# Patient Record
Sex: FEMALE | NOT HISPANIC OR LATINO | Employment: OTHER | URBAN - METROPOLITAN AREA
[De-identification: names, ages, dates, MRNs, and addresses within clinical notes are randomized per-mention and may not be internally consistent; named-entity substitution may affect disease eponyms.]

---

## 2019-04-03 ENCOUNTER — HOSPITAL ENCOUNTER (OUTPATIENT)
Dept: OTHER | Facility: HOSPITAL | Age: 84
Discharge: HOME | End: 2019-04-03
Attending: INTERNAL MEDICINE

## 2019-04-03 LAB
APPEARANCE UR: CLEAR
BILIRUB UR QL: NEGATIVE
COLOR UR: YELLOW
CONV COLLECTION SOURCE (UA): NORMAL
CONV UROBILINOGEN IN URINE BY AUTOMATED TEST STRIP: 1 {EHRLICHU}/DL (ref 0.1–1)
GLUCOSE UR QL: NEGATIVE MG/DL
HGB UR QL STRIP: NEGATIVE
KETONES UR QL STRIP: NEGATIVE MG/DL
LEUKOCYTE ESTERASE UR QL STRIP: NEGATIVE
NITRITE UR QL STRIP: NEGATIVE
PH UR STRIP.AUTO: 5.5 [PH] (ref 5–8)
PROT UR QL: NEGATIVE MG/DL
SP GR UR: 1.02 (ref 1–1.03)

## 2019-04-05 LAB — BACTERIA UR CULT: NORMAL

## 2019-10-02 ENCOUNTER — HOSPITAL ENCOUNTER (OUTPATIENT)
Dept: CARDIOLOGY | Facility: HOSPITAL | Age: 84
Discharge: HOME | End: 2019-10-02
Attending: INTERNAL MEDICINE

## 2019-10-11 ENCOUNTER — HOSPITAL ENCOUNTER (OUTPATIENT)
Dept: GENERAL RADIOLOGY | Facility: HOSPITAL | Age: 84
Discharge: HOME | End: 2019-10-11
Attending: INTERNAL MEDICINE

## 2020-03-12 ENCOUNTER — TELEPHONE (OUTPATIENT)
Dept: CARDIOLOGY | Facility: CLINIC | Age: 85
End: 2020-03-12

## 2020-03-12 NOTE — TELEPHONE ENCOUNTER
Stp's daughter and asked COVID-19 prescreening questions. Pt is doing just fine as of today and will be here for her appointment.

## 2020-03-16 ENCOUNTER — CLINICAL SUPPORT NO REQUIREMENTS (OUTPATIENT)
Dept: CARDIOLOGY | Facility: CLINIC | Age: 85
End: 2020-03-16

## 2020-03-16 ENCOUNTER — OFFICE VISIT (OUTPATIENT)
Dept: CARDIOLOGY | Facility: CLINIC | Age: 85
End: 2020-03-16

## 2020-03-16 VITALS
BODY MASS INDEX: 22.08 KG/M2 | DIASTOLIC BLOOD PRESSURE: 82 MMHG | WEIGHT: 120 LBS | HEIGHT: 62 IN | SYSTOLIC BLOOD PRESSURE: 140 MMHG | HEART RATE: 56 BPM

## 2020-03-16 DIAGNOSIS — Z95.0 PRESENCE OF CARDIAC PACEMAKER: Chronic | ICD-10-CM

## 2020-03-16 DIAGNOSIS — I49.5 SSS (SICK SINUS SYNDROME) (HCC): Chronic | ICD-10-CM

## 2020-03-16 DIAGNOSIS — Z91.81 HISTORY OF FALL: Chronic | ICD-10-CM

## 2020-03-16 DIAGNOSIS — Z86.73 HISTORY OF EMBOLIC STROKE: Chronic | ICD-10-CM

## 2020-03-16 DIAGNOSIS — Z95.1 S/P CABG (CORONARY ARTERY BYPASS GRAFT): Chronic | ICD-10-CM

## 2020-03-16 DIAGNOSIS — I49.5 SICK SINUS SYNDROME (HCC): Primary | ICD-10-CM

## 2020-03-16 DIAGNOSIS — I25.10 CORONARY ARTERY DISEASE INVOLVING NATIVE CORONARY ARTERY OF NATIVE HEART WITHOUT ANGINA PECTORIS: Primary | Chronic | ICD-10-CM

## 2020-03-16 DIAGNOSIS — I10 ESSENTIAL HYPERTENSION: ICD-10-CM

## 2020-03-16 DIAGNOSIS — I48.21 PERMANENT ATRIAL FIBRILLATION (HCC): Chronic | ICD-10-CM

## 2020-03-16 PROCEDURE — 93280 PM DEVICE PROGR EVAL DUAL: CPT | Performed by: INTERNAL MEDICINE

## 2020-03-16 PROCEDURE — 99204 OFFICE O/P NEW MOD 45 MIN: CPT | Performed by: INTERNAL MEDICINE

## 2020-03-16 RX ORDER — LOSARTAN POTASSIUM 25 MG/1
25 TABLET ORAL NIGHTLY
COMMUNITY
Start: 2020-03-03

## 2020-03-16 RX ORDER — ATENOLOL 25 MG/1
25 TABLET ORAL DAILY
COMMUNITY

## 2020-03-16 RX ORDER — BUMETANIDE 1 MG/1
1 TABLET ORAL DAILY
COMMUNITY
Start: 2020-03-10

## 2020-03-16 NOTE — PROGRESS NOTES
Subjective:     Encounter Date:03/16/20      Patient ID: Shanika Quintero is a 89 y.o. female.    Chief Complaint:  History of Present Illness    Dear    I had the pleasure of seeing this patient in the office today for initial evaluation and consultation.  I appreciate that you sent him in to see us.  They come in today to be seen for a history of coronary artery disease sick sinus syndrome, and pacemaker.    Patient had chronic bypass grafting in 1998 by Dr. Amauri Castle.  The two-vessel bypass.  I do not have the records from that surgical intervention.  Patient has a history of chronic atrial fibrillation, sick sinus syndrome, and has a [employer] pacemaker that was placed in February 2015.    Pacemaker was placed in [place] in 2015 by Dr. Herrera.  She had a Lexiscan Cardiolite performed in 2015 as well that showed normal left ventricular systolic function with an ejection fraction of 68% and no ischemia.  She then had an echocardiogram performed in [place] in April 2018.  This showed an ejection of 58% mild mitral regurgitation and mild tricuspid regurgitation.    Patient was hospitalized at [facility] in 2018 with a TIA.  Echocardiogram at that time showed ejection of 65% with mitral annular calcification but no significant valvular dysfunction.  The patient was noted to have problems with instability and prior falls and was felt to be too high risk for bleeding to be placed on anticoagulation despite her history of atrial fibrillation and CVA.  Fortunately she has not had any further neurologic issues since.    She does walk with a walker and has significant problems with stability.    She denies any chest pain, pressure, tightness, squeezing, or heartburn.  She does have dizziness.  She had some problems with swelling and was switched to Bumex.  Since then she has not had any further problems with swelling.  She is scheduled to follow-up with you.  She was told in the past she did not need potassium  supplementation and previously when she was on furosemide she was not on potassium supplementation with apparently normal potassium values.  I do not have her recent lab work from your office, and we will contact your office to obtain that.    The following portions of the patient's history were reviewed and updated as appropriate: allergies, current medications, past family history, past medical history, past social history, past surgical history and problem list.    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   • History of fall 3/16/2020   • Hypertension    • Permanent atrial fibrillation 3/16/2020   • Presence of cardiac pacemaker 3/16/2020   • S/P CABG (coronary artery bypass graft) 3/16/2020   • Sleep apnea     bi pap   • SSS (sick sinus syndrome) (CMS/[facility]) 3/16/2020       Past Surgical History:   Procedure Laterality Date   • APPENDECTOMY     • BYPASS GRAFT         Social History     Socioeconomic History   • Marital status:      Spouse name: Not on file   • Number of children: Not on file   • Years of education: Not on file   • Highest education level: Not on file   Tobacco Use   • Smoking status: Never Smoker   • Smokeless tobacco: Never Used   • Tobacco comment: caff use   Substance and Sexual Activity   • Alcohol use: Never     Frequency: Never       Review of Systems   Constitution: Positive for malaise/fatigue. Negative for chills, decreased appetite, fever and night sweats.   HENT: Negative for ear discharge, ear pain, hearing loss, nosebleeds and sore throat.    Eyes: Negative for blurred vision, double vision and pain.   Cardiovascular: Negative for cyanosis.   Respiratory: Negative for hemoptysis and sputum production.    Endocrine: Negative for cold intolerance and heat intolerance.   Hematologic/Lymphatic: Negative for adenopathy.   Skin: Negative for dry skin, itching, nail  "changes, rash and suspicious lesions.   Musculoskeletal: Positive for joint pain and joint swelling. Negative for arthritis, gout, muscle cramps, muscle weakness, myalgias and neck pain.   Gastrointestinal: Negative for anorexia, bowel incontinence, constipation, diarrhea, dysphagia, hematemesis and jaundice.   Genitourinary: Negative for bladder incontinence, dysuria, flank pain, frequency, hematuria and nocturia.   Neurological: Negative for focal weakness, numbness, paresthesias and seizures.   Psychiatric/Behavioral: Negative for altered mental status, hallucinations, hypervigilance, suicidal ideas and thoughts of violence.   Allergic/Immunologic: Negative for persistent infections.       Procedures       Objective:     Vitals:    03/16/20 1038   BP: 140/82   Pulse: 56   Weight: 54.4 kg (120 lb)   Height: 157.5 cm (62\")         Physical Exam   Constitutional: She is oriented to person, place, and time. She appears well-developed and well-nourished. No distress.   HENT:   Head: Normocephalic and atraumatic.   Nose: Nose normal.   Mouth/Throat: Oropharynx is clear and moist.   Eyes: Pupils are equal, round, and reactive to light. Conjunctivae and EOM are normal. Right eye exhibits no discharge. Left eye exhibits no discharge.   Neck: Normal range of motion. Neck supple. No tracheal deviation present. No thyromegaly present.   Cardiovascular: Normal rate, S1 normal, S2 normal, normal heart sounds and normal pulses. An irregularly irregular rhythm present. Exam reveals no S3.   Pulmonary/Chest: Effort normal and breath sounds normal. No stridor. No respiratory distress. She exhibits no tenderness.   Abdominal: Soft. Bowel sounds are normal. She exhibits no distension and no mass. There is no tenderness. There is no rebound and no guarding.   Musculoskeletal: Normal range of motion. She exhibits no tenderness or deformity.   Lymphadenopathy:     She has no cervical adenopathy.   Neurological: She is alert and " oriented to person, place, and time. She has normal reflexes.   Skin: Skin is warm and dry. No rash noted. She is not diaphoretic. No erythema.   Psychiatric: She has a normal mood and affect. Thought content normal.       Lab Review:             Performed        Assessment:          Diagnosis Plan   1. Coronary artery disease involving native coronary artery of native heart without angina pectoris     2. S/P CABG (coronary artery bypass graft)     3. SSS (sick sinus syndrome) (CMS/HCC)     4. Presence of cardiac pacemaker     5. Essential hypertension     6. History of fall     7. Permanent atrial fibrillation     8. History of embolic stroke            Plan:       1.  CAD-status post CABG as outlined above.  No angina pectus  2.  Sick sinus syndrome, pacemaker in place-patient was enrolled in the device clinic today  3.  Chronic atrial fibrillation-rate control.  4.  History of CVA-2018 at [facility], was deemed to higher risk for anticoagulation because of instability and falls.  5.  Hypertension- reasonable blood pressure control, continue same  6.  Edema-patient has had a prior history of edema and was switched by yourself from furosemide to Bumex and since then is done well.  She is not on potassium supplementation but they understand that her potassium level is been okay.  We have called your office to get her lab results for our files.  She is scheduled to follow back up with you on this.    Thank you very much for allowing us to participate in the care of this pleasant patient.  Please don't hesitate to call if I can be of assistance in any way.      Current Outpatient Medications:   •  Acetaminophen (STANBACK ASPIRIN FREE PO), Take  by mouth., Disp: , Rfl:   •  atenolol (TENORMIN) 25 MG tablet, Take 25 mg by mouth Daily., Disp: , Rfl:   •  bumetanide (BUMEX) 1 MG tablet, Take 1 mg by mouth Daily., Disp: , Rfl:   •  Docusate Calcium (STOOL SOFTENER PO), Take 1 tablet by mouth Daily., Disp: , Rfl:   •   losartan (COZAAR) 25 MG tablet, Take 25 mg by mouth Every Night., Disp: , Rfl:   •  O2 (OXYGEN), Inhale 1 (One) Time., Disp: , Rfl:          Return in about 1 year (around 3/16/2021).

## 2020-03-18 ENCOUNTER — CONVERSION ENCOUNTER (OUTPATIENT)
Dept: PODIATRY | Facility: CLINIC | Age: 85
End: 2020-03-18

## 2020-03-18 ENCOUNTER — OFFICE VISIT CONVERTED (OUTPATIENT)
Dept: PODIATRY | Facility: CLINIC | Age: 85
End: 2020-03-18
Attending: PODIATRIST

## 2020-09-29 ENCOUNTER — HOSPITAL ENCOUNTER (OUTPATIENT)
Dept: GENERAL RADIOLOGY | Facility: HOSPITAL | Age: 85
Discharge: HOME | End: 2020-09-29
Attending: INTERNAL MEDICINE

## 2021-04-18 PROCEDURE — 93296 REM INTERROG EVL PM/IDS: CPT | Performed by: INTERNAL MEDICINE

## 2021-04-18 PROCEDURE — 93294 REM INTERROG EVL PM/LDLS PM: CPT | Performed by: INTERNAL MEDICINE

## 2021-05-15 VITALS
OXYGEN SATURATION: 95 % | HEIGHT: 62 IN | HEART RATE: 87 BPM | BODY MASS INDEX: 22.08 KG/M2 | DIASTOLIC BLOOD PRESSURE: 106 MMHG | SYSTOLIC BLOOD PRESSURE: 162 MMHG | WEIGHT: 120 LBS